# Patient Record
Sex: FEMALE | Race: WHITE | NOT HISPANIC OR LATINO | Employment: OTHER | URBAN - METROPOLITAN AREA
[De-identification: names, ages, dates, MRNs, and addresses within clinical notes are randomized per-mention and may not be internally consistent; named-entity substitution may affect disease eponyms.]

---

## 2021-04-01 ENCOUNTER — NEW PATIENT EMERGENCY (OUTPATIENT)
Dept: URBAN - METROPOLITAN AREA CLINIC 36 | Facility: CLINIC | Age: 66
End: 2021-04-01

## 2021-04-01 DIAGNOSIS — H25.811: ICD-10-CM

## 2021-04-01 DIAGNOSIS — H53.9: ICD-10-CM

## 2021-04-01 DIAGNOSIS — H25.812: ICD-10-CM

## 2021-04-01 PROCEDURE — 92134 CPTRZ OPH DX IMG PST SGM RTA: CPT

## 2021-04-01 PROCEDURE — 92004 COMPRE OPH EXAM NEW PT 1/>: CPT

## 2021-04-01 ASSESSMENT — TONOMETRY
OS_IOP_MMHG: 19
OD_IOP_MMHG: 19

## 2021-04-01 ASSESSMENT — VISUAL ACUITY: OS_CC: 20/30

## 2021-04-06 ENCOUNTER — RETINA CONSULT (OUTPATIENT)
Dept: URBAN - METROPOLITAN AREA CLINIC 36 | Facility: CLINIC | Age: 66
End: 2021-04-06

## 2021-04-06 DIAGNOSIS — H35.033: ICD-10-CM

## 2021-04-06 DIAGNOSIS — H34.8310: ICD-10-CM

## 2021-04-06 PROCEDURE — 67028 INJECTION EYE DRUG: CPT

## 2021-04-06 PROCEDURE — 92235 FLUORESCEIN ANGRPH MLTIFRAME: CPT

## 2021-04-06 PROCEDURE — 99214 OFFICE O/P EST MOD 30 MIN: CPT

## 2021-04-06 PROCEDURE — 92273 FULL FIELD ERG W/I&R: CPT

## 2021-04-06 PROCEDURE — 92250 FUNDUS PHOTOGRAPHY W/I&R: CPT

## 2021-04-06 ASSESSMENT — TONOMETRY
OS_IOP_MMHG: 23
OD_IOP_MMHG: 17

## 2021-04-06 ASSESSMENT — VISUAL ACUITY
OS_CC: 20/20-2
OD_CC: 20/60-2

## 2021-04-14 NOTE — PATIENT DISCUSSION
- Follow up in 1 year for [facility] SERVICES OF [facility], [facility]

## 2021-05-04 ENCOUNTER — ESTABLISHED PATIENT (OUTPATIENT)
Dept: URBAN - METROPOLITAN AREA CLINIC 36 | Facility: CLINIC | Age: 66
End: 2021-05-04

## 2021-05-04 DIAGNOSIS — H35.033: ICD-10-CM

## 2021-05-04 DIAGNOSIS — H34.8310: ICD-10-CM

## 2021-05-04 PROCEDURE — 92202 OPSCPY EXTND ON/MAC DRAW: CPT

## 2021-05-04 PROCEDURE — 99213 OFFICE O/P EST LOW 20 MIN: CPT

## 2021-05-04 PROCEDURE — 67028 INJECTION EYE DRUG: CPT

## 2021-05-04 PROCEDURE — 92134 CPTRZ OPH DX IMG PST SGM RTA: CPT

## 2021-05-04 ASSESSMENT — TONOMETRY
OS_IOP_MMHG: 23
OD_IOP_MMHG: 18

## 2021-05-04 ASSESSMENT — VISUAL ACUITY
OD_CC: 20/20-1
OS_CC: 20/20-1

## 2021-06-01 ENCOUNTER — ESTABLISHED PATIENT (OUTPATIENT)
Dept: URBAN - METROPOLITAN AREA CLINIC 36 | Facility: CLINIC | Age: 66
End: 2021-06-01

## 2021-06-01 DIAGNOSIS — H35.09: ICD-10-CM

## 2021-06-01 DIAGNOSIS — H35.033: ICD-10-CM

## 2021-06-01 DIAGNOSIS — H35.61: ICD-10-CM

## 2021-06-01 DIAGNOSIS — H34.8310: ICD-10-CM

## 2021-06-01 PROCEDURE — 67028 INJECTION EYE DRUG: CPT

## 2021-06-01 PROCEDURE — 92273 FULL FIELD ERG W/I&R: CPT

## 2021-06-01 PROCEDURE — 99213 OFFICE O/P EST LOW 20 MIN: CPT

## 2021-06-01 PROCEDURE — 92250 FUNDUS PHOTOGRAPHY W/I&R: CPT

## 2021-06-01 ASSESSMENT — VISUAL ACUITY
OS_CC: 20/20
OD_CC: 20/25

## 2021-06-01 ASSESSMENT — TONOMETRY
OD_IOP_MMHG: 23
OS_IOP_MMHG: 23

## 2021-07-07 ENCOUNTER — ESTABLISHED PATIENT (OUTPATIENT)
Dept: URBAN - METROPOLITAN AREA CLINIC 36 | Facility: CLINIC | Age: 66
End: 2021-07-07

## 2021-07-07 DIAGNOSIS — H35.033: ICD-10-CM

## 2021-07-07 DIAGNOSIS — H34.8310: ICD-10-CM

## 2021-07-07 DIAGNOSIS — H35.61: ICD-10-CM

## 2021-07-07 PROCEDURE — 92235 FLUORESCEIN ANGRPH MLTIFRAME: CPT

## 2021-07-07 PROCEDURE — 99213 OFFICE O/P EST LOW 20 MIN: CPT

## 2021-07-07 PROCEDURE — 67028 INJECTION EYE DRUG: CPT

## 2021-07-07 PROCEDURE — 92250 FUNDUS PHOTOGRAPHY W/I&R: CPT

## 2021-07-07 ASSESSMENT — VISUAL ACUITY
OD_CC: 20/80+1
OS_CC: 20/20

## 2021-07-07 ASSESSMENT — TONOMETRY
OS_IOP_MMHG: 20
OD_IOP_MMHG: 20

## 2021-08-13 ENCOUNTER — ESTABLISHED PATIENT (OUTPATIENT)
Dept: URBAN - METROPOLITAN AREA CLINIC 43 | Facility: CLINIC | Age: 66
End: 2021-08-13

## 2021-08-13 DIAGNOSIS — H34.8310: ICD-10-CM

## 2021-08-13 DIAGNOSIS — H35.033: ICD-10-CM

## 2021-08-13 DIAGNOSIS — H35.61: ICD-10-CM

## 2021-08-13 PROCEDURE — 92273 FULL FIELD ERG W/I&R: CPT

## 2021-08-13 PROCEDURE — 99214 OFFICE O/P EST MOD 30 MIN: CPT

## 2021-08-13 PROCEDURE — J0178PRE EYLEA PREFILLED SYRINGE

## 2021-08-13 PROCEDURE — 92250 FUNDUS PHOTOGRAPHY W/I&R: CPT

## 2021-08-13 PROCEDURE — 67028 INJECTION EYE DRUG: CPT

## 2021-08-13 ASSESSMENT — VISUAL ACUITY
OD_CC: 20/60
OS_CC: 20/20

## 2021-08-13 ASSESSMENT — TONOMETRY
OS_IOP_MMHG: 24
OD_IOP_MMHG: 21

## 2021-09-10 ENCOUNTER — ESTABLISHED PATIENT (OUTPATIENT)
Dept: URBAN - METROPOLITAN AREA CLINIC 43 | Facility: CLINIC | Age: 66
End: 2021-09-10

## 2021-09-10 DIAGNOSIS — H35.033: ICD-10-CM

## 2021-09-10 DIAGNOSIS — H34.8310: ICD-10-CM

## 2021-09-10 DIAGNOSIS — H35.61: ICD-10-CM

## 2021-09-10 PROCEDURE — 99213 OFFICE O/P EST LOW 20 MIN: CPT

## 2021-09-10 PROCEDURE — J0178PRE EYLEA PREFILLED SYRINGE

## 2021-09-10 PROCEDURE — 67028 INJECTION EYE DRUG: CPT

## 2021-09-10 PROCEDURE — 92134 CPTRZ OPH DX IMG PST SGM RTA: CPT

## 2021-09-10 PROCEDURE — 92202 OPSCPY EXTND ON/MAC DRAW: CPT

## 2021-09-10 ASSESSMENT — TONOMETRY
OD_IOP_MMHG: 17
OS_IOP_MMHG: 20

## 2021-09-10 ASSESSMENT — VISUAL ACUITY
OD_CC: 20/30-1
OS_CC: 20/25-1

## 2021-11-05 ENCOUNTER — ESTABLISHED PATIENT (OUTPATIENT)
Dept: URBAN - METROPOLITAN AREA CLINIC 43 | Facility: CLINIC | Age: 66
End: 2021-11-05

## 2021-11-05 DIAGNOSIS — H35.61: ICD-10-CM

## 2021-11-05 DIAGNOSIS — H35.033: ICD-10-CM

## 2021-11-05 DIAGNOSIS — H34.8310: ICD-10-CM

## 2021-11-05 DIAGNOSIS — H35.30: ICD-10-CM

## 2021-11-05 DIAGNOSIS — H35.09: ICD-10-CM

## 2021-11-05 PROCEDURE — 92273 FULL FIELD ERG W/I&R: CPT

## 2021-11-05 PROCEDURE — 99213 OFFICE O/P EST LOW 20 MIN: CPT

## 2021-11-05 PROCEDURE — J0178PRE EYLEA PREFILLED SYRINGE

## 2021-11-05 PROCEDURE — 92134 CPTRZ OPH DX IMG PST SGM RTA: CPT

## 2021-11-05 PROCEDURE — 67028 INJECTION EYE DRUG: CPT

## 2021-11-05 ASSESSMENT — VISUAL ACUITY
OS_CC: 20/30-2
OD_CC: 20/30-1

## 2021-11-05 ASSESSMENT — TONOMETRY
OD_IOP_MMHG: 18
OS_IOP_MMHG: 19

## 2022-01-05 ENCOUNTER — ESTABLISHED PATIENT (OUTPATIENT)
Dept: URBAN - METROPOLITAN AREA CLINIC 43 | Facility: CLINIC | Age: 67
End: 2022-01-05

## 2022-01-05 DIAGNOSIS — H35.033: ICD-10-CM

## 2022-01-05 DIAGNOSIS — H35.61: ICD-10-CM

## 2022-01-05 DIAGNOSIS — H34.8310: ICD-10-CM

## 2022-01-05 PROCEDURE — 92235 FLUORESCEIN ANGRPH MLTIFRAME: CPT

## 2022-01-05 PROCEDURE — 92250 FUNDUS PHOTOGRAPHY W/I&R: CPT

## 2022-01-05 PROCEDURE — J0178PRE EYLEA PREFILLED SYRINGE

## 2022-01-05 PROCEDURE — 67028 INJECTION EYE DRUG: CPT

## 2022-01-05 PROCEDURE — 99213 OFFICE O/P EST LOW 20 MIN: CPT

## 2022-01-05 ASSESSMENT — VISUAL ACUITY
OD_CC: 20/40-2
OS_CC: 20/40+2

## 2022-01-05 ASSESSMENT — TONOMETRY
OD_IOP_MMHG: 18
OS_IOP_MMHG: 19

## 2022-02-16 ENCOUNTER — CLINIC PROCEDURE ONLY (OUTPATIENT)
Dept: URBAN - METROPOLITAN AREA CLINIC 36 | Facility: CLINIC | Age: 67
End: 2022-02-16

## 2022-02-16 DIAGNOSIS — H34.8310: ICD-10-CM

## 2022-02-16 PROCEDURE — 67028 INJECTION EYE DRUG: CPT

## 2022-02-16 PROCEDURE — J0178PRE EYLEA PREFILLED SYRINGE

## 2022-02-16 PROCEDURE — 92134 CPTRZ OPH DX IMG PST SGM RTA: CPT

## 2022-02-16 ASSESSMENT — VISUAL ACUITY
OS_CC: 20/20-2
OD_CC: 20/25-2

## 2022-02-16 ASSESSMENT — TONOMETRY
OS_IOP_MMHG: 16
OD_IOP_MMHG: 18

## 2022-04-13 ENCOUNTER — ESTABLISHED PATIENT (OUTPATIENT)
Dept: URBAN - METROPOLITAN AREA CLINIC 36 | Facility: CLINIC | Age: 67
End: 2022-04-13

## 2022-04-13 DIAGNOSIS — H35.30: ICD-10-CM

## 2022-04-13 DIAGNOSIS — H35.61: ICD-10-CM

## 2022-04-13 DIAGNOSIS — H35.033: ICD-10-CM

## 2022-04-13 DIAGNOSIS — H34.8310: ICD-10-CM

## 2022-04-13 DIAGNOSIS — H40.052: ICD-10-CM

## 2022-04-13 DIAGNOSIS — H53.9: ICD-10-CM

## 2022-04-13 DIAGNOSIS — H35.09: ICD-10-CM

## 2022-04-13 PROCEDURE — 92235 FLUORESCEIN ANGRPH MLTIFRAME: CPT

## 2022-04-13 PROCEDURE — 92273 FULL FIELD ERG W/I&R: CPT

## 2022-04-13 PROCEDURE — 99214 OFFICE O/P EST MOD 30 MIN: CPT

## 2022-04-13 PROCEDURE — 92250 FUNDUS PHOTOGRAPHY W/I&R: CPT

## 2022-04-13 ASSESSMENT — VISUAL ACUITY
OD_CC: 20/25+2
OS_CC: 20/20-2

## 2022-04-13 ASSESSMENT — TONOMETRY
OD_IOP_MMHG: 20
OS_IOP_MMHG: 25

## 2022-06-28 ENCOUNTER — ESTABLISHED PATIENT (OUTPATIENT)
Dept: URBAN - METROPOLITAN AREA CLINIC 36 | Facility: CLINIC | Age: 67
End: 2022-06-28

## 2022-06-28 DIAGNOSIS — H53.9: ICD-10-CM

## 2022-06-28 DIAGNOSIS — H35.09: ICD-10-CM

## 2022-06-28 DIAGNOSIS — H43.813: ICD-10-CM

## 2022-06-28 DIAGNOSIS — H35.30: ICD-10-CM

## 2022-06-28 DIAGNOSIS — H35.033: ICD-10-CM

## 2022-06-28 DIAGNOSIS — H35.61: ICD-10-CM

## 2022-06-28 DIAGNOSIS — H34.8310: ICD-10-CM

## 2022-06-28 PROCEDURE — 99214 OFFICE O/P EST MOD 30 MIN: CPT

## 2022-06-28 PROCEDURE — 67028 INJECTION EYE DRUG: CPT

## 2022-06-28 PROCEDURE — 92250 FUNDUS PHOTOGRAPHY W/I&R: CPT

## 2022-06-28 PROCEDURE — 92235 FLUORESCEIN ANGRPH MLTIFRAME: CPT

## 2022-06-28 PROCEDURE — J0178PRE EYLEA PREFILLED SYRINGE

## 2022-06-28 ASSESSMENT — TONOMETRY
OD_IOP_MMHG: 17
OS_IOP_MMHG: 17

## 2022-06-28 ASSESSMENT — VISUAL ACUITY
OS_CC: 20/20
OD_CC: 20/40

## 2022-08-31 ENCOUNTER — ESTABLISHED PATIENT (OUTPATIENT)
Dept: URBAN - METROPOLITAN AREA CLINIC 36 | Facility: CLINIC | Age: 67
End: 2022-08-31

## 2022-08-31 DIAGNOSIS — H34.8310: ICD-10-CM

## 2022-08-31 DIAGNOSIS — H35.61: ICD-10-CM

## 2022-08-31 DIAGNOSIS — H40.052: ICD-10-CM

## 2022-08-31 DIAGNOSIS — H35.09: ICD-10-CM

## 2022-08-31 DIAGNOSIS — H43.813: ICD-10-CM

## 2022-08-31 PROCEDURE — 92235 FLUORESCEIN ANGRPH MLTIFRAME: CPT

## 2022-08-31 PROCEDURE — J0178PRE EYLEA PREFILLED SYRINGE

## 2022-08-31 PROCEDURE — 92273 FULL FIELD ERG W/I&R: CPT

## 2022-08-31 PROCEDURE — 67028 INJECTION EYE DRUG: CPT

## 2022-08-31 PROCEDURE — 92250 FUNDUS PHOTOGRAPHY W/I&R: CPT

## 2022-08-31 PROCEDURE — 99214 OFFICE O/P EST MOD 30 MIN: CPT

## 2022-08-31 ASSESSMENT — TONOMETRY
OD_IOP_MMHG: 18
OS_IOP_MMHG: 22

## 2022-08-31 ASSESSMENT — VISUAL ACUITY
OS_CC: 20/25+1
OD_CC: 20/25-2

## 2022-12-07 ENCOUNTER — ESTABLISHED PATIENT (OUTPATIENT)
Dept: URBAN - METROPOLITAN AREA CLINIC 36 | Facility: CLINIC | Age: 67
End: 2022-12-07

## 2022-12-07 PROCEDURE — 67028 INJECTION EYE DRUG: CPT

## 2022-12-07 PROCEDURE — 92235 FLUORESCEIN ANGRPH MLTIFRAME: CPT

## 2022-12-07 PROCEDURE — 92250 FUNDUS PHOTOGRAPHY W/I&R: CPT

## 2022-12-07 PROCEDURE — J0178PRE EYLEA PREFILLED SYRINGE

## 2022-12-07 PROCEDURE — 99214 OFFICE O/P EST MOD 30 MIN: CPT

## 2022-12-07 ASSESSMENT — VISUAL ACUITY
OD_CC: 20/25-1
OS_CC: 20/30+2

## 2022-12-07 ASSESSMENT — TONOMETRY: OD_IOP_MMHG: 17

## 2023-06-21 ENCOUNTER — ESTABLISHED PATIENT (OUTPATIENT)
Dept: URBAN - METROPOLITAN AREA CLINIC 43 | Facility: CLINIC | Age: 68
End: 2023-06-21

## 2023-06-21 DIAGNOSIS — H52.7: ICD-10-CM

## 2023-06-21 DIAGNOSIS — H40.052: ICD-10-CM

## 2023-06-21 DIAGNOSIS — H35.30: ICD-10-CM

## 2023-06-21 DIAGNOSIS — H35.033: ICD-10-CM

## 2023-06-21 DIAGNOSIS — H43.813: ICD-10-CM

## 2023-06-21 DIAGNOSIS — H25.812: ICD-10-CM

## 2023-06-21 DIAGNOSIS — H53.9: ICD-10-CM

## 2023-06-21 DIAGNOSIS — H34.8310: ICD-10-CM

## 2023-06-21 DIAGNOSIS — H35.61: ICD-10-CM

## 2023-06-21 DIAGNOSIS — H35.09: ICD-10-CM

## 2023-06-21 DIAGNOSIS — H25.811: ICD-10-CM

## 2023-06-21 PROCEDURE — 92273 FULL FIELD ERG W/I&R: CPT

## 2023-06-21 PROCEDURE — 99214 OFFICE O/P EST MOD 30 MIN: CPT

## 2023-06-21 PROCEDURE — 67028 INJECTION EYE DRUG: CPT

## 2023-06-21 PROCEDURE — 92250 FUNDUS PHOTOGRAPHY W/I&R: CPT

## 2023-06-21 PROCEDURE — J0178PRE EYLEA PREFILLED SYRINGE

## 2023-06-21 PROCEDURE — 92235 FLUORESCEIN ANGRPH MLTIFRAME: CPT

## 2023-06-21 ASSESSMENT — TONOMETRY
OD_IOP_MMHG: 15
OS_IOP_MMHG: 22

## 2023-06-21 ASSESSMENT — VISUAL ACUITY
OD_CC: 20/20-1
OS_CC: 20/20

## 2023-10-04 ENCOUNTER — ESTABLISHED PATIENT (OUTPATIENT)
Dept: URBAN - METROPOLITAN AREA CLINIC 36 | Facility: CLINIC | Age: 68
End: 2023-10-04

## 2023-10-04 DIAGNOSIS — H25.813: ICD-10-CM

## 2023-10-04 DIAGNOSIS — H40.052: ICD-10-CM

## 2023-10-04 DIAGNOSIS — H35.61: ICD-10-CM

## 2023-10-04 DIAGNOSIS — H35.033: ICD-10-CM

## 2023-10-04 DIAGNOSIS — H34.8310: ICD-10-CM

## 2023-10-04 DIAGNOSIS — H35.09: ICD-10-CM

## 2023-10-04 DIAGNOSIS — H35.30: ICD-10-CM

## 2023-10-04 DIAGNOSIS — H43.813: ICD-10-CM

## 2023-10-04 PROCEDURE — 67028 INJECTION EYE DRUG: CPT

## 2023-10-04 PROCEDURE — 99214 OFFICE O/P EST MOD 30 MIN: CPT | Mod: 25

## 2023-10-04 PROCEDURE — 92250 FUNDUS PHOTOGRAPHY W/I&R: CPT

## 2023-10-04 PROCEDURE — J0178PRE EYLEA PREFILLED SYRINGE: Mod: JZ,RT

## 2023-10-04 PROCEDURE — 92235 FLUORESCEIN ANGRPH MLTIFRAME: CPT

## 2023-10-04 ASSESSMENT — VISUAL ACUITY
OS_CC: 20/20-2
OD_CC: 20/40-2

## 2023-10-04 ASSESSMENT — TONOMETRY
OD_IOP_MMHG: 16
OS_IOP_MMHG: 22

## 2023-10-16 ENCOUNTER — CONSULTATION/EVALUATION (OUTPATIENT)
Dept: URBAN - METROPOLITAN AREA CLINIC 36 | Facility: CLINIC | Age: 68
End: 2023-10-16

## 2023-10-16 DIAGNOSIS — H34.8310: ICD-10-CM

## 2023-10-16 DIAGNOSIS — H35.30: ICD-10-CM

## 2023-10-16 DIAGNOSIS — H01.003: ICD-10-CM

## 2023-10-16 DIAGNOSIS — H35.09: ICD-10-CM

## 2023-10-16 DIAGNOSIS — H35.033: ICD-10-CM

## 2023-10-16 DIAGNOSIS — H01.006: ICD-10-CM

## 2023-10-16 DIAGNOSIS — H25.813: ICD-10-CM

## 2023-10-16 DIAGNOSIS — H40.052: ICD-10-CM

## 2023-10-16 DIAGNOSIS — H43.813: ICD-10-CM

## 2023-10-16 DIAGNOSIS — H35.61: ICD-10-CM

## 2023-10-16 PROCEDURE — 99214 OFFICE O/P EST MOD 30 MIN: CPT

## 2023-10-16 PROCEDURE — 92136 OPHTHALMIC BIOMETRY: CPT

## 2023-10-16 PROCEDURE — 92015 DETERMINE REFRACTIVE STATE: CPT

## 2023-10-16 PROCEDURE — 92134 CPTRZ OPH DX IMG PST SGM RTA: CPT

## 2023-10-16 PROCEDURE — 92025-3 CORNEAL TOPO, REFUSED

## 2023-10-16 PROCEDURE — V2799PMN IMPRIMIS PRED-MOXI-NEPAF 5ML

## 2023-10-16 ASSESSMENT — VISUAL ACUITY
OD_CC: J5
OS_CC: J2
OS_CC: 20/25
OD_CC: 20/50-2

## 2023-10-16 ASSESSMENT — TONOMETRY
OD_IOP_MMHG: 15
OS_IOP_MMHG: 15

## 2023-11-03 ENCOUNTER — PRE-OP/H&P (OUTPATIENT)
Dept: URBAN - METROPOLITAN AREA SURGERY 14 | Facility: SURGERY | Age: 68
End: 2023-11-03

## 2023-11-03 ENCOUNTER — SURGERY/PROCEDURE (OUTPATIENT)
Dept: URBAN - METROPOLITAN AREA CLINIC 36 | Facility: CLINIC | Age: 68
End: 2023-11-03

## 2023-11-03 ENCOUNTER — TECH ONLY (OUTPATIENT)
Dept: URBAN - METROPOLITAN AREA CLINIC 39 | Facility: CLINIC | Age: 68
End: 2023-11-03

## 2023-11-03 DIAGNOSIS — Z96.1: ICD-10-CM

## 2023-11-03 DIAGNOSIS — H25.813: ICD-10-CM

## 2023-11-03 PROCEDURE — 99211T TECH SERVICE

## 2023-11-03 PROCEDURE — 99211HP H&P OFFICE/OUTPATIENT VISIT, EST

## 2023-11-03 PROCEDURE — 66984 XCAPSL CTRC RMVL W/O ECP: CPT

## 2023-11-03 ASSESSMENT — TONOMETRY: OD_IOP_MMHG: 15

## 2023-11-03 ASSESSMENT — VISUAL ACUITY: OD_SC: 20/400

## 2023-11-07 ENCOUNTER — POST OP/EVAL OF SECOND EYE (OUTPATIENT)
Dept: URBAN - METROPOLITAN AREA CLINIC 36 | Facility: CLINIC | Age: 68
End: 2023-11-07

## 2023-11-07 DIAGNOSIS — Z96.1: ICD-10-CM

## 2023-11-07 DIAGNOSIS — H34.8310: ICD-10-CM

## 2023-11-07 DIAGNOSIS — H35.09: ICD-10-CM

## 2023-11-07 DIAGNOSIS — H35.61: ICD-10-CM

## 2023-11-07 DIAGNOSIS — H35.033: ICD-10-CM

## 2023-11-07 DIAGNOSIS — H43.813: ICD-10-CM

## 2023-11-07 DIAGNOSIS — H40.052: ICD-10-CM

## 2023-11-07 DIAGNOSIS — H35.30: ICD-10-CM

## 2023-11-07 DIAGNOSIS — H01.006: ICD-10-CM

## 2023-11-07 DIAGNOSIS — H01.003: ICD-10-CM

## 2023-11-07 DIAGNOSIS — H25.812: ICD-10-CM

## 2023-11-07 PROCEDURE — 99024 POSTOP FOLLOW-UP VISIT: CPT

## 2023-11-07 PROCEDURE — 92012 INTRM OPH EXAM EST PATIENT: CPT

## 2023-11-07 ASSESSMENT — VISUAL ACUITY
OD_SC: 20/50+2
OS_SC: 20/50-1
OD_PH: 20/25-2
OS_SC: J12
OD_SC: J8
OS_PH: 20/40-2

## 2023-11-07 ASSESSMENT — TONOMETRY
OS_IOP_MMHG: 18
OD_IOP_MMHG: 17

## 2023-11-10 ENCOUNTER — TECH ONLY (OUTPATIENT)
Dept: URBAN - METROPOLITAN AREA CLINIC 39 | Facility: CLINIC | Age: 68
End: 2023-11-10

## 2023-11-10 ENCOUNTER — PRE-OP/H&P (OUTPATIENT)
Dept: URBAN - METROPOLITAN AREA SURGERY 14 | Facility: SURGERY | Age: 68
End: 2023-11-10

## 2023-11-10 ENCOUNTER — SURGERY/PROCEDURE (OUTPATIENT)
Dept: URBAN - METROPOLITAN AREA CLINIC 36 | Facility: CLINIC | Age: 68
End: 2023-11-10

## 2023-11-10 DIAGNOSIS — H34.8310: ICD-10-CM

## 2023-11-10 DIAGNOSIS — H35.30: ICD-10-CM

## 2023-11-10 DIAGNOSIS — Z96.1: ICD-10-CM

## 2023-11-10 DIAGNOSIS — H40.052: ICD-10-CM

## 2023-11-10 DIAGNOSIS — H35.033: ICD-10-CM

## 2023-11-10 DIAGNOSIS — H35.09: ICD-10-CM

## 2023-11-10 DIAGNOSIS — H25.812: ICD-10-CM

## 2023-11-10 DIAGNOSIS — H35.61: ICD-10-CM

## 2023-11-10 DIAGNOSIS — H01.006: ICD-10-CM

## 2023-11-10 DIAGNOSIS — H01.003: ICD-10-CM

## 2023-11-10 DIAGNOSIS — H43.813: ICD-10-CM

## 2023-11-10 PROCEDURE — 99211HP H&P OFFICE/OUTPATIENT VISIT, EST

## 2023-11-10 PROCEDURE — 66984 XCAPSL CTRC RMVL W/O ECP: CPT

## 2023-11-10 PROCEDURE — 99211T TECH SERVICE

## 2023-11-10 ASSESSMENT — TONOMETRY
OS_IOP_MMHG: 27
OS_IOP_MMHG: 22

## 2023-11-10 ASSESSMENT — VISUAL ACUITY
OS_SC: J12
OS_SC: 20/30-2

## 2023-11-14 ENCOUNTER — POST-OP (OUTPATIENT)
Dept: URBAN - METROPOLITAN AREA CLINIC 36 | Facility: CLINIC | Age: 68
End: 2023-11-14

## 2023-11-14 DIAGNOSIS — Z96.1: ICD-10-CM

## 2023-11-14 PROCEDURE — 99024 POSTOP FOLLOW-UP VISIT: CPT

## 2023-11-14 ASSESSMENT — VISUAL ACUITY
OS_SC: J4
OS_SC: 20/25
OD_SC: 20/40+2
OD_PH: 20/25
OD_SC: J10

## 2023-11-14 ASSESSMENT — TONOMETRY
OS_IOP_MMHG: 17
OD_IOP_MMHG: 16

## 2023-11-22 ENCOUNTER — POST-OP (OUTPATIENT)
Dept: URBAN - METROPOLITAN AREA CLINIC 36 | Facility: CLINIC | Age: 68
End: 2023-11-22

## 2023-11-22 DIAGNOSIS — Z96.1: ICD-10-CM

## 2023-11-22 PROCEDURE — 99024 POSTOP FOLLOW-UP VISIT: CPT

## 2023-11-22 ASSESSMENT — VISUAL ACUITY
OS_SC: 20/40-2
OS_PH: 20/20-2
OD_SC: J8
OS_SC: J5
OD_SC: 20/50-2
OD_PH: 20/30

## 2023-11-22 ASSESSMENT — TONOMETRY
OD_IOP_MMHG: 18
OS_IOP_MMHG: 18

## 2023-12-12 ENCOUNTER — POST-OP (OUTPATIENT)
Dept: URBAN - METROPOLITAN AREA CLINIC 36 | Facility: CLINIC | Age: 68
End: 2023-12-12

## 2023-12-12 DIAGNOSIS — Z96.1: ICD-10-CM

## 2023-12-12 PROCEDURE — 99024 POSTOP FOLLOW-UP VISIT: CPT

## 2023-12-12 ASSESSMENT — VISUAL ACUITY
OD_SC: J8
OS_SC: 20/25-1
OS_SC: J4
OD_PH: 20/30-1
OD_SC: 20/40

## 2023-12-12 ASSESSMENT — TONOMETRY
OS_IOP_MMHG: 18
OD_IOP_MMHG: 18

## 2024-01-03 ENCOUNTER — CONTACT LENSES/GLASSES VISIT (OUTPATIENT)
Dept: URBAN - METROPOLITAN AREA CLINIC 36 | Facility: CLINIC | Age: 69
End: 2024-01-03

## 2024-01-03 DIAGNOSIS — H52.7: ICD-10-CM

## 2024-01-03 PROCEDURE — 92015GRNC REFRACTION GLASSES RECHECK - NO CHARGE

## 2024-01-03 ASSESSMENT — VISUAL ACUITY
OS_CC: 20/20
OS_CC: J2
OS_SC: 20/25
OD_CC: 20/25+2
OD_SC: 20/30-1
OD_CC: J8
OD_SC: J6
OS_SC: J3

## 2024-02-07 ENCOUNTER — ESTABLISHED PATIENT (OUTPATIENT)
Dept: URBAN - METROPOLITAN AREA CLINIC 36 | Facility: CLINIC | Age: 69
End: 2024-02-07

## 2024-02-07 DIAGNOSIS — H34.8310: ICD-10-CM

## 2024-02-07 DIAGNOSIS — H35.61: ICD-10-CM

## 2024-02-07 DIAGNOSIS — H35.30: ICD-10-CM

## 2024-02-07 DIAGNOSIS — H35.033: ICD-10-CM

## 2024-02-07 DIAGNOSIS — H35.09: ICD-10-CM

## 2024-02-07 DIAGNOSIS — Z96.1: ICD-10-CM

## 2024-02-07 PROCEDURE — 67028 INJECTION EYE DRUG: CPT

## 2024-02-07 PROCEDURE — J0178PRE EYLEA PREFILLED SYRINGE

## 2024-02-07 PROCEDURE — 99214 OFFICE O/P EST MOD 30 MIN: CPT

## 2024-02-07 PROCEDURE — 92250 FUNDUS PHOTOGRAPHY W/I&R: CPT

## 2024-02-07 PROCEDURE — 92273 FULL FIELD ERG W/I&R: CPT

## 2024-02-07 PROCEDURE — 92235 FLUORESCEIN ANGRPH MLTIFRAME: CPT

## 2024-02-07 RX ORDER — PREDNISOLONE ACETATE 10 MG/ML
1 SUSPENSION/ DROPS OPHTHALMIC
Start: 2024-02-07

## 2024-02-07 ASSESSMENT — TONOMETRY
OD_IOP_MMHG: 18
OS_IOP_MMHG: 20

## 2024-02-07 ASSESSMENT — VISUAL ACUITY
OD_CC: 20/40+2
OS_CC: 20/20

## 2024-03-18 ENCOUNTER — CLINIC PROCEDURE ONLY (OUTPATIENT)
Dept: URBAN - METROPOLITAN AREA CLINIC 43 | Facility: CLINIC | Age: 69
End: 2024-03-18

## 2024-03-18 DIAGNOSIS — H35.351: ICD-10-CM

## 2024-03-18 DIAGNOSIS — H34.8310: ICD-10-CM

## 2024-03-18 DIAGNOSIS — H43.813: ICD-10-CM

## 2024-03-18 DIAGNOSIS — H35.30: ICD-10-CM

## 2024-03-18 DIAGNOSIS — H35.09: ICD-10-CM

## 2024-03-18 DIAGNOSIS — H35.033: ICD-10-CM

## 2024-03-18 PROCEDURE — J0178PRE EYLEA PREFILLED SYRINGE

## 2024-03-18 PROCEDURE — 99213 OFFICE O/P EST LOW 20 MIN: CPT

## 2024-03-18 PROCEDURE — 92250 FUNDUS PHOTOGRAPHY W/I&R: CPT

## 2024-03-18 PROCEDURE — 67028 INJECTION EYE DRUG: CPT

## 2024-03-18 ASSESSMENT — TONOMETRY
OS_IOP_MMHG: 13
OD_IOP_MMHG: 15

## 2024-03-18 ASSESSMENT — VISUAL ACUITY
OD_SC: 20/70+1
OS_SC: 20/30-2

## 2024-05-15 ENCOUNTER — CLINIC PROCEDURE ONLY (OUTPATIENT)
Dept: URBAN - METROPOLITAN AREA CLINIC 36 | Facility: CLINIC | Age: 69
End: 2024-05-15

## 2024-05-15 DIAGNOSIS — H34.8310: ICD-10-CM

## 2024-05-15 PROCEDURE — 67028 INJECTION EYE DRUG: CPT

## 2024-05-15 PROCEDURE — 92250 FUNDUS PHOTOGRAPHY W/I&R: CPT

## 2024-05-15 PROCEDURE — J0178PRE EYLEA PREFILLED SYRINGE: Mod: JZ,RT

## 2024-05-15 ASSESSMENT — TONOMETRY
OS_IOP_MMHG: 11
OD_IOP_MMHG: 10

## 2024-05-15 ASSESSMENT — VISUAL ACUITY
OD_CC: 20/30-1
OS_CC: 20/20

## 2024-07-10 ENCOUNTER — COMPREHENSIVE EXAM (OUTPATIENT)
Dept: URBAN - METROPOLITAN AREA CLINIC 36 | Facility: CLINIC | Age: 69
End: 2024-07-10

## 2024-07-10 DIAGNOSIS — H35.351: ICD-10-CM

## 2024-07-10 DIAGNOSIS — H04.123: ICD-10-CM

## 2024-07-10 DIAGNOSIS — H34.8310: ICD-10-CM

## 2024-07-10 DIAGNOSIS — H35.033: ICD-10-CM

## 2024-07-10 DIAGNOSIS — H43.813: ICD-10-CM

## 2024-07-10 DIAGNOSIS — H35.30: ICD-10-CM

## 2024-07-10 DIAGNOSIS — H35.09: ICD-10-CM

## 2024-07-10 PROCEDURE — 99213 OFFICE O/P EST LOW 20 MIN: CPT

## 2024-07-10 PROCEDURE — 92235 FLUORESCEIN ANGRPH MLTIFRAME: CPT

## 2024-07-10 PROCEDURE — 92134 CPTRZ OPH DX IMG PST SGM RTA: CPT

## 2024-07-10 ASSESSMENT — TONOMETRY
OD_IOP_MMHG: 10
OS_IOP_MMHG: 14

## 2024-07-10 ASSESSMENT — VISUAL ACUITY
OD_CC: 20/25-2
OS_CC: 20/20

## 2024-09-04 ENCOUNTER — COMPREHENSIVE EXAM (OUTPATIENT)
Dept: URBAN - METROPOLITAN AREA CLINIC 36 | Facility: CLINIC | Age: 69
End: 2024-09-04

## 2024-09-04 DIAGNOSIS — H35.09: ICD-10-CM

## 2024-09-04 DIAGNOSIS — H04.123: ICD-10-CM

## 2024-09-04 DIAGNOSIS — H35.351: ICD-10-CM

## 2024-09-04 DIAGNOSIS — H35.61: ICD-10-CM

## 2024-09-04 DIAGNOSIS — H34.8310: ICD-10-CM

## 2024-09-04 DIAGNOSIS — H35.30: ICD-10-CM

## 2024-09-04 DIAGNOSIS — H43.813: ICD-10-CM

## 2024-09-04 DIAGNOSIS — H35.033: ICD-10-CM

## 2024-09-04 PROCEDURE — 92273 FULL FIELD ERG W/I&R: CPT

## 2024-09-04 PROCEDURE — 67028 INJECTION EYE DRUG: CPT

## 2024-09-04 PROCEDURE — J0178PRE EYLEA PREFILLED SYRINGE: Mod: JZ,RT

## 2024-09-04 PROCEDURE — 99213 OFFICE O/P EST LOW 20 MIN: CPT | Mod: 25

## 2024-09-04 PROCEDURE — 92250 FUNDUS PHOTOGRAPHY W/I&R: CPT

## 2024-11-26 ENCOUNTER — COMPREHENSIVE EXAM (OUTPATIENT)
Dept: URBAN - METROPOLITAN AREA CLINIC 46 | Facility: CLINIC | Age: 69
End: 2024-11-26

## 2024-11-26 DIAGNOSIS — H35.61: ICD-10-CM

## 2024-11-26 DIAGNOSIS — H35.351: ICD-10-CM

## 2024-11-26 DIAGNOSIS — H35.30: ICD-10-CM

## 2024-11-26 DIAGNOSIS — H35.09: ICD-10-CM

## 2024-11-26 DIAGNOSIS — H43.813: ICD-10-CM

## 2024-11-26 DIAGNOSIS — H04.123: ICD-10-CM

## 2024-11-26 DIAGNOSIS — H35.033: ICD-10-CM

## 2024-11-26 DIAGNOSIS — H34.8310: ICD-10-CM

## 2024-11-26 PROCEDURE — 92235 FLUORESCEIN ANGRPH MLTIFRAME: CPT

## 2024-11-26 PROCEDURE — 99213 OFFICE O/P EST LOW 20 MIN: CPT | Mod: 25

## 2024-11-26 PROCEDURE — J0178PRE EYLEA PREFILLED SYRINGE: Mod: JZ,RT

## 2024-11-26 PROCEDURE — 92250 FUNDUS PHOTOGRAPHY W/I&R: CPT

## 2024-11-26 PROCEDURE — 67028 INJECTION EYE DRUG: CPT

## 2025-03-05 ENCOUNTER — COMPREHENSIVE EXAM (OUTPATIENT)
Age: 70
End: 2025-03-05

## 2025-03-05 DIAGNOSIS — H43.813: ICD-10-CM

## 2025-03-05 DIAGNOSIS — H35.09: ICD-10-CM

## 2025-03-05 DIAGNOSIS — H34.8310: ICD-10-CM

## 2025-03-05 DIAGNOSIS — H35.351: ICD-10-CM

## 2025-03-05 DIAGNOSIS — H35.033: ICD-10-CM

## 2025-03-05 DIAGNOSIS — H35.61: ICD-10-CM

## 2025-03-05 DIAGNOSIS — H04.123: ICD-10-CM

## 2025-03-05 DIAGNOSIS — H35.30: ICD-10-CM

## 2025-03-05 PROCEDURE — 67028 INJECTION EYE DRUG: CPT

## 2025-03-05 PROCEDURE — J0178PRE EYLEA PREFILLED SYRINGE: Mod: JZ,RT

## 2025-03-05 PROCEDURE — 99213 OFFICE O/P EST LOW 20 MIN: CPT | Mod: 25

## 2025-03-05 PROCEDURE — 92235 FLUORESCEIN ANGRPH MLTIFRAME: CPT

## 2025-03-05 PROCEDURE — 92134 CPTRZ OPH DX IMG PST SGM RTA: CPT

## 2025-06-11 ENCOUNTER — COMPREHENSIVE EXAM (OUTPATIENT)
Age: 70
End: 2025-06-11

## 2025-06-11 DIAGNOSIS — H43.813: ICD-10-CM

## 2025-06-11 DIAGNOSIS — H34.8310: ICD-10-CM

## 2025-06-11 DIAGNOSIS — H35.351: ICD-10-CM

## 2025-06-11 DIAGNOSIS — H35.09: ICD-10-CM

## 2025-06-11 DIAGNOSIS — H35.033: ICD-10-CM

## 2025-06-11 DIAGNOSIS — H35.30: ICD-10-CM

## 2025-06-11 DIAGNOSIS — H35.61: ICD-10-CM

## 2025-06-11 DIAGNOSIS — H04.123: ICD-10-CM

## 2025-06-11 PROCEDURE — 99213 OFFICE O/P EST LOW 20 MIN: CPT

## 2025-06-11 PROCEDURE — 92134 CPTRZ OPH DX IMG PST SGM RTA: CPT

## 2025-06-11 PROCEDURE — 92235 FLUORESCEIN ANGRPH MLTIFRAME: CPT

## 2025-08-05 ENCOUNTER — COMPREHENSIVE EXAM (OUTPATIENT)
Age: 70
End: 2025-08-05

## 2025-08-05 DIAGNOSIS — H35.30: ICD-10-CM

## 2025-08-05 DIAGNOSIS — H35.61: ICD-10-CM

## 2025-08-05 DIAGNOSIS — H01.003: ICD-10-CM

## 2025-08-05 DIAGNOSIS — H04.123: ICD-10-CM

## 2025-08-05 DIAGNOSIS — H01.006: ICD-10-CM

## 2025-08-05 DIAGNOSIS — H43.813: ICD-10-CM

## 2025-08-05 DIAGNOSIS — H35.09: ICD-10-CM

## 2025-08-05 DIAGNOSIS — H35.351: ICD-10-CM

## 2025-08-05 DIAGNOSIS — H34.8310: ICD-10-CM

## 2025-08-05 DIAGNOSIS — H35.033: ICD-10-CM

## 2025-08-05 PROCEDURE — 67028 INJECTION EYE DRUG: CPT

## 2025-08-05 PROCEDURE — 92273 FULL FIELD ERG W/I&R: CPT

## 2025-08-05 PROCEDURE — J0178PRE EYLEA PREFILLED SYRINGE: Mod: JZ,RT

## 2025-08-05 PROCEDURE — 92134 CPTRZ OPH DX IMG PST SGM RTA: CPT

## 2025-08-05 PROCEDURE — 99213 OFFICE O/P EST LOW 20 MIN: CPT | Mod: 25
